# Patient Record
Sex: MALE | Race: WHITE | NOT HISPANIC OR LATINO | Employment: FULL TIME | RURAL
[De-identification: names, ages, dates, MRNs, and addresses within clinical notes are randomized per-mention and may not be internally consistent; named-entity substitution may affect disease eponyms.]

---

## 2023-09-18 DIAGNOSIS — Z87.19 PERSONAL HISTORY OF UNSPECIFIED DIGESTIVE DISEASE: ICD-10-CM

## 2023-09-18 DIAGNOSIS — K76.0 HEPATIC STEATOSIS: ICD-10-CM

## 2023-09-18 DIAGNOSIS — R10.11 ABDOMINAL PAIN, RIGHT UPPER QUADRANT: Primary | ICD-10-CM

## 2023-09-28 ENCOUNTER — OFFICE VISIT (OUTPATIENT)
Dept: GASTROENTEROLOGY | Facility: CLINIC | Age: 53
End: 2023-09-28
Payer: COMMERCIAL

## 2023-09-28 VITALS
BODY MASS INDEX: 31.98 KG/M2 | SYSTOLIC BLOOD PRESSURE: 152 MMHG | HEART RATE: 52 BPM | WEIGHT: 199 LBS | OXYGEN SATURATION: 100 % | HEIGHT: 66 IN | DIASTOLIC BLOOD PRESSURE: 85 MMHG

## 2023-09-28 DIAGNOSIS — R10.11 ABDOMINAL PAIN, RIGHT UPPER QUADRANT: ICD-10-CM

## 2023-09-28 DIAGNOSIS — R10.10 UPPER ABDOMINAL PAIN: Primary | ICD-10-CM

## 2023-09-28 DIAGNOSIS — K76.0 HEPATIC STEATOSIS: ICD-10-CM

## 2023-09-28 DIAGNOSIS — Z87.19 PERSONAL HISTORY OF UNSPECIFIED DIGESTIVE DISEASE: ICD-10-CM

## 2023-09-28 PROCEDURE — 99214 OFFICE O/P EST MOD 30 MIN: CPT | Mod: PBBFAC

## 2023-09-28 PROCEDURE — 3008F PR BODY MASS INDEX (BMI) DOCUMENTED: ICD-10-PCS | Mod: ,,,

## 2023-09-28 PROCEDURE — 3077F SYST BP >= 140 MM HG: CPT | Mod: ,,,

## 2023-09-28 PROCEDURE — 3077F PR MOST RECENT SYSTOLIC BLOOD PRESSURE >= 140 MM HG: ICD-10-PCS | Mod: ,,,

## 2023-09-28 PROCEDURE — 1159F MED LIST DOCD IN RCRD: CPT | Mod: ,,,

## 2023-09-28 PROCEDURE — 3008F BODY MASS INDEX DOCD: CPT | Mod: ,,,

## 2023-09-28 PROCEDURE — 99203 PR OFFICE/OUTPT VISIT, NEW, LEVL III, 30-44 MIN: ICD-10-PCS | Mod: S$PBB,,,

## 2023-09-28 PROCEDURE — 99203 OFFICE O/P NEW LOW 30 MIN: CPT | Mod: S$PBB,,,

## 2023-09-28 PROCEDURE — 3079F DIAST BP 80-89 MM HG: CPT | Mod: ,,,

## 2023-09-28 PROCEDURE — 1160F RVW MEDS BY RX/DR IN RCRD: CPT | Mod: ,,,

## 2023-09-28 PROCEDURE — 3079F PR MOST RECENT DIASTOLIC BLOOD PRESSURE 80-89 MM HG: ICD-10-PCS | Mod: ,,,

## 2023-09-28 PROCEDURE — 1160F PR REVIEW ALL MEDS BY PRESCRIBER/CLIN PHARMACIST DOCUMENTED: ICD-10-PCS | Mod: ,,,

## 2023-09-28 PROCEDURE — 1159F PR MEDICATION LIST DOCUMENTED IN MEDICAL RECORD: ICD-10-PCS | Mod: ,,,

## 2023-09-28 RX ORDER — BUPRENORPHINE AND NALOXONE 8; 2 MG/1; MG/1
1 FILM, SOLUBLE BUCCAL; SUBLINGUAL
COMMUNITY

## 2023-09-28 RX ORDER — OMEPRAZOLE 40 MG/1
40 CAPSULE, DELAYED RELEASE ORAL DAILY
Qty: 30 CAPSULE | Refills: 11 | Status: SHIPPED | OUTPATIENT
Start: 2023-09-28 | End: 2024-09-27

## 2023-09-28 RX ORDER — AMLODIPINE BESYLATE 2.5 MG/1
1 TABLET ORAL DAILY
COMMUNITY
Start: 2023-09-27

## 2023-09-28 NOTE — PROGRESS NOTES
Vincent Henry is a 53 y.o. male here for Abdominal Pain        PCP: No, Primary Doctor  Referring Provider: Marga Hayes, [facility]  [address] [place] [id number]     HPI:  Vincent Henry is a 54 yo male who is referred to clinic with hepatic steatosis and upper abdominal pain. Onset of symptoms approximately one month ago. He has US abdomen per PCP for abdominal pain and found to have fatty liver (recent LFTs within normal range). No abnormal findings of the gallbladder reported. He denies any nausea, vomiting, constipation, diarrhea, hematochezia or melena. He denies weight loss. He denies alcohol. He is scheduled to see Dr. Farley at [facility] in November for cardiac evaluation after abnormal EKG. He does not wish to pursue any endoscopy until he has had cardiac evaluation. Denies any FMH of CRC.          ROS:  Review of Systems   Constitutional:  Negative for activity change, appetite change, fatigue and unexpected weight change.   HENT:  Negative for trouble swallowing.    Gastrointestinal:  Positive for abdominal pain and reflux. Negative for blood in stool, constipation, diarrhea, nausea and vomiting.   Musculoskeletal:  Negative for gait problem.   Integumentary:  Negative for color change.          PMHX:  has a past medical history of Diverticulitis and Essential (primary) hypertension.    PSHX:  has a past surgical history that includes Rotator cuff repair (Left).    PFHX: family history includes COPD in his mother; Skin cancer in his father.    PSlHX:  reports that he has been smoking cigarettes. He started smoking about 36 years ago. He has a 13.4 pack-year smoking history. His smokeless tobacco use includes snuff. He reports that he does not currently use alcohol. He reports that he does not currently use drugs after having used the following drugs: Marijuana.        Review of patient's allergies indicates:  No Known Allergies    Medication List with Changes/Refills   New  "Medications    OMEPRAZOLE (PRILOSEC) 40 MG CAPSULE    Take 1 capsule (40 mg total) by mouth once daily.   Current Medications    AMLODIPINE (NORVASC) 2.5 MG TABLET    Take 1 tablet by mouth once daily.    BUPRENORPHINE-NALOXONE 8-2 MG (SUBOXONE) 8-2 MG    Place 1 Film under the tongue Every 3 (three) days.        Objective Findings:  Vital Signs:  BP (!) 152/85   Pulse (!) 52   Ht 5' 6" (1.676 m)   Wt 90.3 kg (199 lb)   SpO2 100%   BMI 32.12 kg/m²  Body mass index is 32.12 kg/m².    Physical Exam:  Physical Exam  Vitals reviewed.   Constitutional:       General: He is not in acute distress.     Appearance: Normal appearance.   HENT:      Mouth/Throat:      Mouth: Mucous membranes are moist.   Cardiovascular:      Rate and Rhythm: Normal rate.   Pulmonary:      Effort: Pulmonary effort is normal.   Abdominal:      General: Bowel sounds are normal. There is no distension.      Palpations: Abdomen is soft. There is no mass.      Tenderness: There is no abdominal tenderness. There is no guarding.   Skin:     General: Skin is warm and dry.   Neurological:      Mental Status: He is alert and oriented to person, place, and time.   Psychiatric:         Mood and Affect: Mood normal.          Labs:  No results found for: "WBC", "HGB", "HCT", "MCV", "RDW", "PLT", "GRAN", "LYMPH", "MONO", "EOS", "BASO"  No results found for: "NA", "K", "CL", "CO2", "GLU", "BUN", "CREATININE", "CALCIUM", "PROT", "ALBUMIN", "BILITOT", "ALKPHOS", "AST", "ALT"      Imaging: No results found.      Assessment:  Vincent Henry is a 53 y.o. male here with:  1. Upper abdominal pain    2. Personal history of unspecified digestive disease    3. Hepatic steatosis    4. Abdominal pain, right upper quadrant          Recommendations:  1. Will start Prilosec 40 mg daily; if symptoms do not improve, consider EGD at follow-up   2. Exercise 150 minutes per week  Weight loss of 7-10%. Weight loss should be gradual  Diet low in saturated fats and " carbohydrates  Good glucose and cholesterol control     Follow up in about 3 months (around 12/28/2023).      Order summary:  Orders Placed This Encounter    omeprazole (PRILOSEC) 40 MG capsule       Thank you for allowing me to participate in the care of Vincent Henry.      Missy Whittington, FNP-BC, GUERRERO-ACNP-BC